# Patient Record
Sex: FEMALE | Race: WHITE | NOT HISPANIC OR LATINO | Employment: OTHER | URBAN - NONMETROPOLITAN AREA
[De-identification: names, ages, dates, MRNs, and addresses within clinical notes are randomized per-mention and may not be internally consistent; named-entity substitution may affect disease eponyms.]

---

## 2017-01-09 RX ORDER — ALPRAZOLAM 0.5 MG/1
0.5 TABLET ORAL 2 TIMES DAILY PRN
Qty: 30 TABLET | Refills: 0 | Status: SHIPPED | OUTPATIENT
Start: 2017-01-09 | End: 2017-03-30

## 2017-01-09 NOTE — TELEPHONE ENCOUNTER
Patient's spouse  last night and family request something for anxiety. Xanax prescribed and family will . Viktor performed

## 2017-02-14 ENCOUNTER — TELEPHONE (OUTPATIENT)
Dept: FAMILY MEDICINE CLINIC | Facility: CLINIC | Age: 77
End: 2017-02-14

## 2017-02-14 NOTE — TELEPHONE ENCOUNTER
[facility] called with patient PT/INR 4.06. Patient is taking Coumadin 5mg daily. Instructed to hold Coumadin until patient apt tomorrow 02/15/17. Home Health states will call and instruct patient

## 2017-02-15 ENCOUNTER — OFFICE VISIT (OUTPATIENT)
Dept: FAMILY MEDICINE CLINIC | Facility: CLINIC | Age: 77
End: 2017-02-15

## 2017-02-15 VITALS
HEART RATE: 101 BPM | OXYGEN SATURATION: 97 % | TEMPERATURE: 97.6 F | SYSTOLIC BLOOD PRESSURE: 120 MMHG | HEIGHT: 66 IN | BODY MASS INDEX: 28.61 KG/M2 | DIASTOLIC BLOOD PRESSURE: 70 MMHG | WEIGHT: 178 LBS

## 2017-02-15 DIAGNOSIS — J18.9 PNEUMONIA DUE TO INFECTIOUS ORGANISM, UNSPECIFIED LATERALITY, UNSPECIFIED PART OF LUNG: Primary | ICD-10-CM

## 2017-02-15 DIAGNOSIS — I48.20 CHRONIC ATRIAL FIBRILLATION (HCC): ICD-10-CM

## 2017-02-15 DIAGNOSIS — F41.1 GAD (GENERALIZED ANXIETY DISORDER): ICD-10-CM

## 2017-02-15 DIAGNOSIS — D68.32 WARFARIN-INDUCED COAGULOPATHY (HCC): ICD-10-CM

## 2017-02-15 DIAGNOSIS — J44.1 COPD WITH EXACERBATION (HCC): ICD-10-CM

## 2017-02-15 DIAGNOSIS — T45.515A WARFARIN-INDUCED COAGULOPATHY (HCC): ICD-10-CM

## 2017-02-15 DIAGNOSIS — H61.23 EXCESSIVE CERUMEN IN BOTH EAR CANALS: ICD-10-CM

## 2017-02-15 PROCEDURE — 99214 OFFICE O/P EST MOD 30 MIN: CPT | Performed by: INTERNAL MEDICINE

## 2017-02-15 PROCEDURE — 69210 REMOVE IMPACTED EAR WAX UNI: CPT | Performed by: INTERNAL MEDICINE

## 2017-02-15 RX ORDER — WARFARIN SODIUM 5 MG/1
5 TABLET ORAL
COMMUNITY
End: 2017-07-26 | Stop reason: DRUGHIGH

## 2017-02-15 RX ORDER — POLYETHYLENE GLYCOL 3350 17 G/17G
17 POWDER, FOR SOLUTION ORAL DAILY
COMMUNITY

## 2017-02-15 RX ORDER — IPRATROPIUM BROMIDE AND ALBUTEROL SULFATE 2.5; .5 MG/3ML; MG/3ML
3 SOLUTION RESPIRATORY (INHALATION) EVERY 6 HOURS
COMMUNITY
End: 2017-02-27 | Stop reason: SDUPTHER

## 2017-02-15 RX ORDER — FLUCONAZOLE 150 MG/1
150 TABLET ORAL DAILY
COMMUNITY
End: 2017-03-30

## 2017-02-15 RX ORDER — AMOXICILLIN AND CLAVULANATE POTASSIUM 875; 125 MG/1; MG/1
1 TABLET, FILM COATED ORAL 2 TIMES DAILY
COMMUNITY
End: 2017-03-23 | Stop reason: ALTCHOICE

## 2017-02-15 RX ORDER — MIRTAZAPINE 15 MG/1
15 TABLET, FILM COATED ORAL NIGHTLY
Qty: 30 TABLET | Refills: 11 | Status: SHIPPED | OUTPATIENT
Start: 2017-02-15

## 2017-02-15 NOTE — PROGRESS NOTES
Subjective          History of Present Illness     Kathy Villanueva is a 76 y.o. female with a history of COPD here for hospital follow up.  She resided at [facility] for six years and went  home to live with her son and daughter-in-law last fall. She was admitted to [facility] on 02/05/2017 with dyspnea and shortness of breath consistent with acute COPD exacerbation and pneumonia and discharged on 02/10/17.  She was treated with IV Solu-Medrol, nebulizer treatments and empiric antibiotic coverage with a 10-day course of Augmentin.  She continues on four Duoneb treatments daily since discharge.  [facility] hospital called yesterday with patient PT/INR 4.06.  Patient was taking Coumadin 5mg daily per discharge instructions from the hospital.  We instructed Home Health to let her know to hold Coumadin until patient she came in today.  We further instructed her today to resume Coumadin at 1/2 tablet of the Coumadin 4 mg (2 mg) to start Thursday evening and continue until Tuesday evening resuming the 4 mg dose when she completes her course of Levaquin.  She denies any evidence of GI blood loss.    Her daughter reports she has some increased episodes of daytime anxiety and agitation, for which she was given IV medication in the hospital and Kathy responded very well.  They are unsure of the name of the medication.  She uses Xanax p.r.n. And continues on Effexor XR.   She reports she is sleeping well since returning home.  We are going to add Remeron to take nightly.      She is reporting recent hearing loss on the left.  Exam today reveals cerumen impaction bilateral ear canals, left greater than right.  Warm water ear lavage irrigation is performed today to bilateral ear canals without difficulty or complications.  She tolerated well.   Recommended ear wax softening drops to    Review of Systems   Constitutional: Negative for chills, fatigue and fever.   HENT: Negative for congestion, ear pain, postnasal drip,  sinus pressure and sore throat.    Respiratory: Negative for cough, shortness of breath and wheezing.    Cardiovascular: Negative for chest pain, palpitations and leg swelling.   Gastrointestinal: Negative for abdominal pain, blood in stool, constipation, diarrhea, nausea and vomiting.   Endocrine: Negative for cold intolerance, heat intolerance, polydipsia and polyuria.   Genitourinary: Negative for dysuria, frequency, hematuria and urgency.   Skin: Negative for rash.   Neurological: Negative for syncope and weakness.      Ear Cerumen Removal Instrumentation  Date/Time: 2/15/2017 2:02 PM  Performed by: LEIGHA HOFFMAN  Authorized by: LEIGHA HOFFMAN  Local anesthetic: none  Location details: bilateral ears  Procedure type: irrigation  Patient sedated: no  Patient tolerance: Patient tolerated the procedure well with no immediate complications  Comments: The patient received warm water lavage irrigation to the bilateral ears without difficulty or complication.        Objective     Physical Exam   Constitutional: She is oriented to person, place, and time. She appears well-developed and well-nourished. No distress.   HENT:   Head: Normocephalic and atraumatic.   Nose: Right sinus exhibits no maxillary sinus tenderness and no frontal sinus tenderness. Left sinus exhibits no maxillary sinus tenderness and no frontal sinus tenderness.   Mouth/Throat: Uvula is midline, oropharynx is clear and moist and mucous membranes are normal. No oral lesions. No tonsillar exudate.   Bilateral cerumen impaction.    Eyes: Conjunctivae and EOM are normal. Pupils are equal, round, and reactive to light.   Neck: Trachea normal. Neck supple. No JVD present. Carotid bruit is not present. No tracheal deviation present. No thyroid mass and no thyromegaly present.   Cardiovascular: Normal rate and normal heart sounds.   No extrasystoles are present. PMI is not displaced.    No murmur heard.  Irregularly irregular rhythm   Pulmonary/Chest: Effort  normal. No accessory muscle usage. No respiratory distress. She has no decreased breath sounds. She has wheezes. She has rhonchi. She has no rales.   Increased bronchial sounds.   Bilateral scattered rhonchi and wheezes   Abdominal: Soft. Bowel sounds are normal. She exhibits no distension. There is no hepatosplenomegaly. There is no tenderness.       Vascular Status -  Her exam exhibits right foot vasculature normal and right foot edema (Trace edema right ankle. ). Her exam exhibits left foot vasculature normal. Her exam exhibits no left foot edema.  Lymphadenopathy:     She has no cervical adenopathy.   Neurological: She is alert and oriented to person, place, and time. No cranial nerve deficit. Coordination normal.   Chronic right-sided weakness.      Skin: Skin is warm, dry and intact. No rash noted. No cyanosis. Nails show no clubbing.   Psychiatric: She has a normal mood and affect. Her speech is normal and behavior is normal. Thought content normal.   Vitals reviewed.           Assessment/Plan      After informed verbal consent, warm water ear lavage irrigation is performed to bilateral ear canals today without difficulty or complications.  She tolerated well.       Add Remeron to take 1 tablet by mouth q.h.s. for anxiety/agitation.     We will have her stop by for a repeat chest x-ray to document clearing of pneumonia.     Instructed to resume Coumadin at 1/2 tablet of the Coumadin 4 mg (2 mg) to start Thursday evening and continue until Tuesday evening resuming the 4 mg dose when she completes her course of Levaquin.  I'll have home health obtain a weekly pro time for the next 3 weeks.    Scribed for Dr. Delcid by Harika Linn [facility].     Diagnoses and all orders for this visit:    Pneumonia due to infectious organism, unspecified laterality, unspecified part of lung  -     XR Chest PA & Lateral    Excessive cerumen in both ear canals  -     Ear Cerumen Removal Instrumentation    COPD with  exacerbation  -     XR Chest PA & Lateral    PRASHANTH (generalized anxiety disorder)  Comments:  On Effexor.  Uses Xanax as needed.  Added Remeron, 2/2017    Warfarin-induced coagulopathy  Comments:  Likely aggravated by antibiotics during hospitalization, 2/2017    Chronic atrial fibrillation    Other orders  -     amoxicillin-clavulanate (AUGMENTIN) 875-125 MG per tablet; Take 1 tablet by mouth 2 (Two) Times a Day.  -     fluconazole (DIFLUCAN) 150 MG tablet; Take 150 mg by mouth Daily. X 7 days  -     polyethylene glycol (MIRALAX) packet; Take 17 g by mouth Daily.  -     ipratropium-albuterol (DUO-NEB) 0.5-2.5 mg/mL nebulizer; Take 3 mL by nebulization Every 6 (Six) Hours.  -     warfarin (COUMADIN) 5 MG tablet; Take 5 mg by mouth Daily.  -     mirtazapine (REMERON) 15 MG tablet; Take 1 tablet by mouth Every Night.        No visits with results within 3 Week(s) from this visit.  Latest known visit with results is:    Hospital Outpatient Visit on 11/11/2016   Component Date Value Ref Range Status   • Protime 11/11/2016 29.1* 11.1 - 15.3 seconds Final   • INR 11/11/2016 2.9* 0.8 - 1.2 Final    Comment: Therapeutic range for most indications is 2.0 - 3.0 INR or 2.5 - 3.5 for  mechanical   heart valves.     ]

## 2017-02-27 RX ORDER — GUAIFENESIN 600 MG/1
600 TABLET, EXTENDED RELEASE ORAL 2 TIMES DAILY
Qty: 30 TABLET | Refills: 0 | Status: SHIPPED | OUTPATIENT
Start: 2017-02-27 | End: 2017-03-30

## 2017-02-27 RX ORDER — IPRATROPIUM BROMIDE AND ALBUTEROL SULFATE 2.5; .5 MG/3ML; MG/3ML
3 SOLUTION RESPIRATORY (INHALATION) EVERY 6 HOURS
Qty: 360 ML | Refills: 5 | Status: SHIPPED | OUTPATIENT
Start: 2017-02-27 | End: 2017-09-11 | Stop reason: SDUPTHER

## 2017-03-13 ENCOUNTER — TELEPHONE (OUTPATIENT)
Dept: FAMILY MEDICINE CLINIC | Facility: CLINIC | Age: 77
End: 2017-03-13

## 2017-03-13 NOTE — TELEPHONE ENCOUNTER
Spoke to Sejal from [facility] regarding lab order for this patient. She needs CMP, CBC, Protime every 2 weeks x 2 months. Start the week of the 13th.

## 2017-03-20 ENCOUNTER — TELEPHONE (OUTPATIENT)
Dept: FAMILY MEDICINE CLINIC | Facility: CLINIC | Age: 77
End: 2017-03-20

## 2017-03-22 ENCOUNTER — TELEPHONE (OUTPATIENT)
Dept: FAMILY MEDICINE CLINIC | Facility: CLINIC | Age: 77
End: 2017-03-22

## 2017-03-22 NOTE — TELEPHONE ENCOUNTER
I called and spoke to Sejal at [facility] regarding positive stool. Instructed to inform us when last one obtained and we will arrange apt. For patient. She states the stool samples should be completed the next 2 days and she will also relay to patient regarding apt.

## 2017-03-23 RX ORDER — CEFUROXIME AXETIL 500 MG/1
500 TABLET ORAL 2 TIMES DAILY
Qty: 14 TABLET | Refills: 0 | Status: SHIPPED | OUTPATIENT
Start: 2017-03-23 | End: 2017-03-30

## 2017-03-27 ENCOUNTER — TELEPHONE (OUTPATIENT)
Dept: FAMILY MEDICINE CLINIC | Facility: CLINIC | Age: 77
End: 2017-03-27

## 2017-03-27 NOTE — TELEPHONE ENCOUNTER
Spoke to Adilia at [facility] regarding protime/inr of 4.97. She takes Coumadin 4mg daily. Dr. Delcid ordered to hold Coumadin x 3 days and recheck on the 29th. She has an apt on the 30th. Adilia will call and inform patient.

## 2017-03-27 NOTE — TELEPHONE ENCOUNTER
Spoke to Sejal at [facility] regarding abnormal liver function. Dr. Delcid ordered to stop Simvastatin and home health to draw CMP weekly x 4 weeks. Voiced understanding.

## 2017-03-30 ENCOUNTER — TELEPHONE (OUTPATIENT)
Dept: FAMILY MEDICINE CLINIC | Facility: CLINIC | Age: 77
End: 2017-03-30

## 2017-03-30 ENCOUNTER — OFFICE VISIT (OUTPATIENT)
Dept: FAMILY MEDICINE CLINIC | Facility: CLINIC | Age: 77
End: 2017-03-30

## 2017-03-30 VITALS
TEMPERATURE: 96.1 F | BODY MASS INDEX: 28.61 KG/M2 | WEIGHT: 178 LBS | HEIGHT: 66 IN | SYSTOLIC BLOOD PRESSURE: 120 MMHG | HEART RATE: 80 BPM | OXYGEN SATURATION: 89 % | DIASTOLIC BLOOD PRESSURE: 60 MMHG

## 2017-03-30 DIAGNOSIS — I48.20 CHRONIC ATRIAL FIBRILLATION (HCC): ICD-10-CM

## 2017-03-30 DIAGNOSIS — S81.811A NONINFECTED SKIN TEAR OF LOWER EXTREMITY, RIGHT, INITIAL ENCOUNTER: ICD-10-CM

## 2017-03-30 DIAGNOSIS — D64.9 ANEMIA, UNSPECIFIED TYPE: Primary | ICD-10-CM

## 2017-03-30 DIAGNOSIS — S00.83XA HEMATOMA OF FACE, INITIAL ENCOUNTER: ICD-10-CM

## 2017-03-30 DIAGNOSIS — IMO0001 FALL IN ELDERLY PATIENT, INITIAL ENCOUNTER: ICD-10-CM

## 2017-03-30 DIAGNOSIS — S80.02XA CONTUSION OF LEFT KNEE, INITIAL ENCOUNTER: ICD-10-CM

## 2017-03-30 DIAGNOSIS — R79.89 ABNORMAL LFTS: ICD-10-CM

## 2017-03-30 PROCEDURE — 99214 OFFICE O/P EST MOD 30 MIN: CPT | Performed by: INTERNAL MEDICINE

## 2017-03-30 NOTE — TELEPHONE ENCOUNTER
I called and spoke to Linda at [facility] with new orders. Patient continue to hold Coumadin and draw Protime,Ferritin and Vit b12 tomorrow then after results of Protime will determine dosage of Coumadin. Lab draw weekly for CMP,CBC,Protimes

## 2017-03-30 NOTE — PROGRESS NOTES
Subjective     Kathy Villanueva is a 76 y.o. female.     History of Present Illness   Kathy is our very complex patient with multiple medical issues including cerebrovascular disease and COPD and chronic atrial fibrillation on Coumadin.  Several new issues have recently developed.  He continues to live at home with her adult son and daughter-in-law.    The patient experienced a fall at home 6 days ago.  She did not seek medical attention for the fall.  She was using her walker, but still fell in the home.  Her daughter-in-law was there at the time.  She has a large hematoma on the left side of the face.  She has a resolving contusion on the left knee.  She has a skin tear on the right anterior shin.  We reviewed wound care instructions.  Home health is following this.  She has lots of bruises.  PolyMem is covering the skin tear on the right shin.  Serous drainage is starting to slow.  They are keeping the legs elevated.    She is completing antibiotic for respiratory tract infection.  Her most recent pro time was elevated.  We have her holding Coumadin currently.  We will resume Coumadin when she is once again therapeutic.    Her labs reveal a worsening anemia.  When she was hospitalized last month, she required 2 units packed red blood cells.  Her baseline hemoglobin is around 11.  Most recent hemoglobin is 9.7.  Stool for Hemoccult is positive ×1.  The family has not been able to collect the other 2 stools.  We discussed workup of the GI blood loss.  She denies upper GI symptoms on her Protonix.  At the current time, they have chosen not to pursue a GI workup.  They understand the situation fully.  They feel that it would be quite stressful for the patient to undergo GI workup at this time.  We agreed to continue to reassess the issue based on her blood counts.  I'm going to have home health check ferritin and vitamin B12 level with her next lab draw.    Her recent labs also demonstrated markedly elevated LFTs.   "Etiology is undetermined.  I am hopeful this is due to the fall and possible hepatic contusion.  She is not tender on abdominal exam today.  Home health will check a CMP weekly.        Review of Systems   Constitutional: Positive for fatigue. Negative for chills and fever.   HENT: Negative for congestion, ear pain, postnasal drip, sinus pressure and sore throat.    Respiratory: Negative for cough, shortness of breath and wheezing.    Cardiovascular: Negative for chest pain, palpitations and leg swelling.   Gastrointestinal: Negative for abdominal pain, blood in stool, constipation, diarrhea, nausea and vomiting.   Endocrine: Negative for cold intolerance, heat intolerance, polydipsia and polyuria.   Genitourinary: Negative for dysuria, frequency, hematuria and urgency.   Skin: Positive for wound. Negative for rash.   Neurological: Positive for weakness. Negative for dizziness, syncope and light-headedness.       Objective     /60  Pulse 80  Temp 96.1 °F (35.6 °C)  Ht 66\" (167.6 cm)  Wt 178 lb (80.7 kg)  SpO2 (!) 89% Comment: 3 l/m  BMI 28.73 kg/m2    Physical Exam   Constitutional: She is oriented to person, place, and time. She appears well-developed and well-nourished. No distress.   Seated in a wheelchair, accompanied by her son and daughter-in-law.  Wearing supplemental oxygen.   HENT:   Head: Normocephalic and atraumatic.   Nose: Nose normal.   Mouth/Throat: Oropharynx is clear and moist. No oropharyngeal exudate.   Eyes: EOM are normal. Pupils are equal, round, and reactive to light.   Neck: Neck supple. No JVD present. No thyromegaly present.   Cardiovascular: Normal rate, regular rhythm and normal heart sounds.    Pulmonary/Chest: Effort normal. No accessory muscle usage. No respiratory distress. She has wheezes (Bilateral). Rhonchi: bilateral  She has no rales.   Chronic lung sounds and a few expiratory wheezes, but no rhonchi   Abdominal: Soft. Bowel sounds are normal. She exhibits no " distension. There is no tenderness.   Musculoskeletal: She exhibits no edema.   Lots of bruising on the left side of the face, but no evidence of facial fracture.   Lymphadenopathy:     She has no cervical adenopathy.   Neurological: She is alert and oriented to person, place, and time. No cranial nerve deficit.   Chronic right-sided weakness    Skin:   4 cm Skin tear is noted on the anterior shin of the right lower extremity.  No evidence of secondary cellulitis.    Evidence of healing contusion of the left knee.    Resolving hematoma of the left side of the face.   Psychiatric: She has a normal mood and affect. Her speech is normal and behavior is normal.   Vitals reviewed.      PHQ-9 Depression Screening 3/30/2017   Little interest or pleasure in doing things 0   Feeling down, depressed, or hopeless 0   Trouble falling or staying asleep, or sleeping too much 3   Feeling tired or having little energy 3   Poor appetite or overeating 2   Trouble concentrating on things, such as reading the newspaper or watching television 1   Moving or speaking so slowly that other people could have noticed. Or the opposite - being so fidgety or restless that you have been moving around a lot more than usual 3   PHQ-9 Total Score 12   If you checked off any problems, how difficult have these problems made it for you to do your work, take care of things at home, or get along with other people? Very difficult       Assessment/Plan   I will have home health continue to assist in wound care.  We have ordered additional amounts of PolyMem and discussed appropriate frequency of changes of the skin tear.    We currently have her holding Coumadin due to supratherapeutic level, possibly aggravated by recent antibiotics are now completed.  We'll have home health repeat a pro time tomorrow morning.  Her previous dose of Coumadin was 4 mg daily.    After discussing the Hemoccult positive stool and worsened anemia, they have decided against any  current referral for a GI workup.  They will continue to watch for signs or symptoms of blood in the stool.  We will have home health continue to follow her pro time and CBC closely.    The etiology of her elevated LFTs is undetermined.  It may be related to her recent fall.  We will have home health check a CBC weekly.  If still markedly elevated with recheck, we will have them hold simvastatin.    Fall precautions are reinforced.  Continue to use the walker.      Diagnoses and all orders for this visit:    Anemia, unspecified type-Hemoccult positive stool, 3/2017.  Declined GI workup    Chronic atrial fibrillation    Abnormal LFTs    Fall in elderly patient, initial encounter    Hematoma of face, initial encounter    Contusion of left knee, initial encounter    Noninfected skin tear of lower extremity, right, initial encounter        No visits with results within 3 Week(s) from this visit.  Latest known visit with results is:    Hospital Outpatient Visit on 11/11/2016   Component Date Value Ref Range Status   • Protime 11/11/2016 29.1* 11.1 - 15.3 seconds Final   • INR 11/11/2016 2.9* 0.8 - 1.2 Final    Comment: Therapeutic range for most indications is 2.0 - 3.0 INR or 2.5 - 3.5 for  mechanical   heart valves.     ]

## 2017-03-31 ENCOUNTER — TELEPHONE (OUTPATIENT)
Dept: FAMILY MEDICINE CLINIC | Facility: CLINIC | Age: 77
End: 2017-03-31

## 2017-03-31 NOTE — TELEPHONE ENCOUNTER
Spoke to Tiffany at [facility]. Dr. Delcid ordered to resume prior dose of Coumadin which was 4mg daily and Home health is rechecking weekly. TP

## 2017-04-06 ENCOUNTER — TELEPHONE (OUTPATIENT)
Dept: FAMILY MEDICINE CLINIC | Facility: CLINIC | Age: 77
End: 2017-04-06

## 2017-04-06 NOTE — TELEPHONE ENCOUNTER
I spoke to Sejal at [facility] 04/05/17.  Patient had been on Coumadin 3 days before being rechecked.  Instructed to continue same regimen and  recheck in 1 week.

## 2017-05-09 ENCOUNTER — TELEPHONE (OUTPATIENT)
Dept: FAMILY MEDICINE CLINIC | Facility: CLINIC | Age: 77
End: 2017-05-09

## 2017-05-26 ENCOUNTER — TELEPHONE (OUTPATIENT)
Dept: FAMILY MEDICINE CLINIC | Facility: CLINIC | Age: 77
End: 2017-05-26

## 2017-05-30 ENCOUNTER — OFFICE VISIT (OUTPATIENT)
Dept: FAMILY MEDICINE CLINIC | Facility: CLINIC | Age: 77
End: 2017-05-30

## 2017-05-30 VITALS
HEIGHT: 66 IN | TEMPERATURE: 97.8 F | DIASTOLIC BLOOD PRESSURE: 60 MMHG | HEART RATE: 89 BPM | OXYGEN SATURATION: 92 % | SYSTOLIC BLOOD PRESSURE: 130 MMHG | BODY MASS INDEX: 28.28 KG/M2 | WEIGHT: 176 LBS

## 2017-05-30 DIAGNOSIS — I10 ESSENTIAL HYPERTENSION: Primary | ICD-10-CM

## 2017-05-30 DIAGNOSIS — I48.20 CHRONIC ATRIAL FIBRILLATION (HCC): ICD-10-CM

## 2017-05-30 DIAGNOSIS — E87.6 HYPOKALEMIA: ICD-10-CM

## 2017-05-30 DIAGNOSIS — J43.1 PANLOBULAR EMPHYSEMA (HCC): ICD-10-CM

## 2017-05-30 DIAGNOSIS — R79.89 ABNORMAL LFTS: ICD-10-CM

## 2017-05-30 DIAGNOSIS — I50.22 CHRONIC SYSTOLIC CONGESTIVE HEART FAILURE (HCC): ICD-10-CM

## 2017-05-30 LAB
ALBUMIN SERPL-MCNC: 3.2 G/DL (ref 3.2–5.5)
ALBUMIN/GLOB SERPL: 0.8 G/DL (ref 1–3)
ALP SERPL-CCNC: 101 U/L (ref 15–121)
ALT SERPL W P-5'-P-CCNC: 42 U/L (ref 10–60)
ANION GAP SERPL CALCULATED.3IONS-SCNC: 10 MMOL/L (ref 5–15)
AST SERPL-CCNC: 23 U/L (ref 10–60)
BILIRUB SERPL-MCNC: 0.4 MG/DL (ref 0.2–1)
BNP SERPL-MCNC: 231 PG/ML (ref 0–100)
BUN BLD-MCNC: 24 MG/DL (ref 8–25)
BUN/CREAT SERPL: 30 (ref 7–25)
CALCIUM SPEC-SCNC: 9 MG/DL (ref 8.4–10.8)
CHLORIDE SERPL-SCNC: 98 MMOL/L (ref 100–112)
CO2 SERPL-SCNC: 29 MMOL/L (ref 20–32)
CREAT BLD-MCNC: 0.8 MG/DL (ref 0.4–1.3)
GFR SERPL CREATININE-BSD FRML MDRD: 69 ML/MIN/1.73 (ref 39–90)
GLOBULIN UR ELPH-MCNC: 3.9 GM/DL (ref 2.5–4.6)
GLUCOSE BLD-MCNC: 137 MG/DL (ref 70–100)
POTASSIUM BLD-SCNC: 5.2 MMOL/L (ref 3.4–5.4)
PROT SERPL-MCNC: 7.1 G/DL (ref 6.7–8.2)
SODIUM BLD-SCNC: 137 MMOL/L (ref 134–146)

## 2017-05-30 PROCEDURE — 83880 ASSAY OF NATRIURETIC PEPTIDE: CPT | Performed by: INTERNAL MEDICINE

## 2017-05-30 PROCEDURE — 80053 COMPREHEN METABOLIC PANEL: CPT | Performed by: INTERNAL MEDICINE

## 2017-05-30 PROCEDURE — 99214 OFFICE O/P EST MOD 30 MIN: CPT | Performed by: INTERNAL MEDICINE

## 2017-05-30 PROCEDURE — 36415 COLL VENOUS BLD VENIPUNCTURE: CPT | Performed by: INTERNAL MEDICINE

## 2017-05-30 PROCEDURE — 85025 COMPLETE CBC W/AUTO DIFF WBC: CPT | Performed by: INTERNAL MEDICINE

## 2017-05-30 PROCEDURE — 85060 BLOOD SMEAR INTERPRETATION: CPT | Performed by: INTERNAL MEDICINE

## 2017-05-30 RX ORDER — FUROSEMIDE 20 MG/1
20 TABLET ORAL EVERY MORNING
Qty: 30 TABLET | Refills: 11 | COMMUNITY
Start: 2017-05-30 | End: 2017-06-23 | Stop reason: SDUPTHER

## 2017-05-30 RX ORDER — ATORVASTATIN CALCIUM 20 MG/1
20 TABLET, FILM COATED ORAL EVERY OTHER DAY
Qty: 15 TABLET | Refills: 11 | Status: SHIPPED | OUTPATIENT
Start: 2017-05-30 | End: 2017-07-17 | Stop reason: SDUPTHER

## 2017-05-30 RX ORDER — ATORVASTATIN CALCIUM 20 MG/1
20 TABLET, FILM COATED ORAL DAILY
Qty: 30 TABLET | Refills: 11 | Status: SHIPPED | OUTPATIENT
Start: 2017-05-30 | End: 2017-05-30

## 2017-05-31 LAB
BASOPHILS # BLD AUTO: 0.01 10*3/MM3 (ref 0–0.2)
BASOPHILS NFR BLD AUTO: 0.1 % (ref 0–2)
CYTOLOGIST CVX/VAG CYTO: NORMAL
DEPRECATED RDW RBC AUTO: 56.8 FL (ref 36.4–46.3)
EOSINOPHIL # BLD AUTO: 0.08 10*3/MM3 (ref 0–0.7)
EOSINOPHIL NFR BLD AUTO: 0.5 % (ref 0–7)
ERYTHROCYTE [DISTWIDTH] IN BLOOD BY AUTOMATED COUNT: 17.2 % (ref 11.5–14.5)
HCT VFR BLD AUTO: 37.2 % (ref 35–45)
HGB BLD-MCNC: 11.8 G/DL (ref 12–15.5)
LYMPHOCYTES # BLD AUTO: 0.9 10*3/MM3 (ref 0.6–4.2)
LYMPHOCYTES NFR BLD AUTO: 5.9 % (ref 10–50)
MCH RBC QN AUTO: 29.6 PG (ref 26.5–34)
MCHC RBC AUTO-ENTMCNC: 31.7 G/DL (ref 31.4–36)
MCV RBC AUTO: 93.2 FL (ref 80–98)
MONOCYTES # BLD AUTO: 0.64 10*3/MM3 (ref 0–0.9)
MONOCYTES NFR BLD AUTO: 4.2 % (ref 0–12)
NEUTROPHILS # BLD AUTO: 13.73 10*3/MM3 (ref 2–8.6)
NEUTROPHILS NFR BLD AUTO: 89.3 % (ref 37–80)
PATH INTERP BLD-IMP: NORMAL
PLAT MORPH BLD: NORMAL
PLATELET # BLD AUTO: 335 10*3/MM3 (ref 150–450)
PMV BLD AUTO: 9.1 FL (ref 8–12)
RBC # BLD AUTO: 3.99 10*6/MM3 (ref 3.77–5.16)
RBC MORPH BLD: NORMAL
WBC MORPH BLD: NORMAL
WBC NRBC COR # BLD: 15.36 10*3/MM3 (ref 3.2–9.8)

## 2017-06-13 ENCOUNTER — TELEPHONE (OUTPATIENT)
Dept: FAMILY MEDICINE CLINIC | Facility: CLINIC | Age: 77
End: 2017-06-13

## 2017-06-13 RX ORDER — LISINOPRIL 10 MG/1
10 TABLET ORAL 2 TIMES DAILY
Qty: 60 TABLET | Refills: 11 | Status: SHIPPED | OUTPATIENT
Start: 2017-06-13 | End: 2017-06-14 | Stop reason: ALTCHOICE

## 2017-06-13 NOTE — TELEPHONE ENCOUNTER
I spoke to Tiffany at [facility] and informed to continue Coumadin at 4mg daily and recheck in 1 month. Her INR was 3.16. TP

## 2017-06-23 ENCOUNTER — TELEPHONE (OUTPATIENT)
Dept: FAMILY MEDICINE CLINIC | Facility: CLINIC | Age: 77
End: 2017-06-23

## 2017-06-23 RX ORDER — FUROSEMIDE 20 MG/1
20 TABLET ORAL EVERY MORNING
Qty: 30 TABLET | Refills: 11 | Status: SHIPPED | OUTPATIENT
Start: 2017-06-23 | End: 2018-05-04 | Stop reason: ALTCHOICE

## 2017-07-17 RX ORDER — ATORVASTATIN CALCIUM 20 MG/1
20 TABLET, FILM COATED ORAL EVERY OTHER DAY
Qty: 45 TABLET | Refills: 3 | Status: SHIPPED | OUTPATIENT
Start: 2017-07-17 | End: 2017-07-26 | Stop reason: ALTCHOICE

## 2017-07-26 ENCOUNTER — TELEPHONE (OUTPATIENT)
Dept: FAMILY MEDICINE CLINIC | Facility: CLINIC | Age: 77
End: 2017-07-26

## 2017-07-26 ENCOUNTER — OFFICE VISIT (OUTPATIENT)
Dept: FAMILY MEDICINE CLINIC | Facility: CLINIC | Age: 77
End: 2017-07-26

## 2017-07-26 ENCOUNTER — LAB (OUTPATIENT)
Dept: LAB | Facility: OTHER | Age: 77
End: 2017-07-26

## 2017-07-26 VITALS
OXYGEN SATURATION: 95 % | TEMPERATURE: 97 F | DIASTOLIC BLOOD PRESSURE: 64 MMHG | BODY MASS INDEX: 29.21 KG/M2 | HEART RATE: 74 BPM | WEIGHT: 181 LBS | SYSTOLIC BLOOD PRESSURE: 110 MMHG

## 2017-07-26 DIAGNOSIS — G40.909 SEIZURE DISORDER (HCC): Chronic | ICD-10-CM

## 2017-07-26 DIAGNOSIS — I50.22 CHRONIC SYSTOLIC CONGESTIVE HEART FAILURE (HCC): Chronic | ICD-10-CM

## 2017-07-26 DIAGNOSIS — J43.1 PANLOBULAR EMPHYSEMA (HCC): Primary | Chronic | ICD-10-CM

## 2017-07-26 DIAGNOSIS — Z79.01 ANTICOAGULANT LONG-TERM USE: ICD-10-CM

## 2017-07-26 DIAGNOSIS — D51.8 VITAMIN B12 DEFICIENCY (DIETARY) ANEMIA: Chronic | ICD-10-CM

## 2017-07-26 DIAGNOSIS — I48.20 CHRONIC ATRIAL FIBRILLATION (HCC): Chronic | ICD-10-CM

## 2017-07-26 DIAGNOSIS — K21.9 GASTROESOPHAGEAL REFLUX DISEASE WITHOUT ESOPHAGITIS: Chronic | ICD-10-CM

## 2017-07-26 LAB
INR PPP: 3.55 (ref 0.8–1.2)
PROTHROMBIN TIME: 36.1 SECONDS (ref 11.1–15.3)

## 2017-07-26 PROCEDURE — 85610 PROTHROMBIN TIME: CPT | Performed by: INTERNAL MEDICINE

## 2017-07-26 PROCEDURE — 36415 COLL VENOUS BLD VENIPUNCTURE: CPT | Performed by: INTERNAL MEDICINE

## 2017-07-26 PROCEDURE — 99214 OFFICE O/P EST MOD 30 MIN: CPT | Performed by: INTERNAL MEDICINE

## 2017-07-26 RX ORDER — ALBUTEROL SULFATE 2.5 MG/3ML
3 SOLUTION RESPIRATORY (INHALATION) 4 TIMES DAILY PRN
COMMUNITY

## 2017-07-26 RX ORDER — ALPRAZOLAM 0.5 MG/1
1 TABLET ORAL 2 TIMES DAILY
COMMUNITY

## 2017-07-26 RX ORDER — POTASSIUM CHLORIDE 750 MG/1
10 TABLET, FILM COATED, EXTENDED RELEASE ORAL DAILY
COMMUNITY
Start: 2017-06-08 | End: 2018-01-26 | Stop reason: SDUPTHER

## 2017-07-26 RX ORDER — BENZONATATE 200 MG/1
200 CAPSULE ORAL 3 TIMES DAILY PRN
Qty: 60 CAPSULE | Refills: 0 | Status: SHIPPED | OUTPATIENT
Start: 2017-07-26 | End: 2018-01-26

## 2017-07-26 NOTE — TELEPHONE ENCOUNTER
----- Message from Chuck Delcid MD sent at 7/26/2017  4:13 PM CDT -----  Contact the patient's family.  Hold Coumadin tonight.  Decrease Coumadin per protocol.  Have home health repeat a pro time in one week.  SHAWNA      07/26/2017   Spoke to Kim her Daughter in law. Instructed to hold Coumadin  tonight and restart at 4,4,2.  I spoke to Vicki at Home health will recheck next week. TP

## 2017-07-26 NOTE — PROGRESS NOTES
Subjective       History of Present Illness     Kathy Villanueva is a 77 y.o. female with a history of COPD among multiple medical issues here for a follow up hospitalization at [facility] for COPD exacerbation, possible pneumonia, and CHF exacerbation.  She was admitted on July 4th and discharged on July 15th with instructions including follow up PT/INR to be monitored with home health.  She was discharged with prednisone 20 mg taper and a follow up chest x-ray was recommended in 7-14 days.  Protime on the 17th was therapuetic.  She will have a repeat PT/INR and chest x-ray today.  Home Health continues to follow her since disharge.  She continues to have a nonproductive cough.  She is doing four nebulizer treatments q.i.d.  Her son reports she had good results with Tessalon Perles in the past.     There has been no seizure activity on the Dilantin.    Review of Systems   Constitutional: Negative for chills, fatigue and fever.   HENT: Negative for congestion, ear pain, postnasal drip, sinus pressure and sore throat.    Respiratory: Positive for cough and wheezing. Negative for shortness of breath.    Cardiovascular: Negative for chest pain, palpitations and leg swelling.   Gastrointestinal: Negative for abdominal pain, blood in stool, constipation, diarrhea, nausea and vomiting.   Endocrine: Negative for cold intolerance, heat intolerance, polydipsia and polyuria.   Genitourinary: Negative for dysuria, frequency, hematuria and urgency.   Skin: Negative for rash.   Neurological: Negative for syncope and weakness.      PHQ-9 Depression Screening 5/30/2017   Little interest or pleasure in doing things 0   Feeling down, depressed, or hopeless 0   Trouble falling or staying asleep, or sleeping too much 0   Feeling tired or having little energy 0   Poor appetite or overeating 0   Feeling bad about yourself - or that you are a failure or have let yourself or your family down 0   Trouble concentrating on things, such as  reading the newspaper or watching television 0   Moving or speaking so slowly that other people could have noticed. Or the opposite - being so fidgety or restless that you have been moving around a lot more than usual 0   Thoughts that you would be better off dead, or of hurting yourself in some way 0   PHQ-9 Total Score 0   If you checked off any problems, how difficult have these problems made it for you to do your work, take care of things at home, or get along with other people? Not difficult at all     Objective     Vitals:    07/26/17 1412   BP: 110/64   Pulse: 74   Temp: 97 °F (36.1 °C)   TempSrc: Oral   SpO2: 95%   Weight: 181 lb (82.1 kg)       Physical Exam   Constitutional: She is oriented to person, place, and time. She appears well-developed and well-nourished. No distress.   Seated in a wheelchair, accompanied by her son.  Wearing supplemental oxygen.     HENT:   Head: Normocephalic and atraumatic.   Nose: Right sinus exhibits no maxillary sinus tenderness and no frontal sinus tenderness. Left sinus exhibits no maxillary sinus tenderness and no frontal sinus tenderness.   Mouth/Throat: Uvula is midline, oropharynx is clear and moist and mucous membranes are normal. No oral lesions. No tonsillar exudate.   Eyes: Conjunctivae and EOM are normal. Pupils are equal, round, and reactive to light.   Neck: Trachea normal. Neck supple. No JVD present. Carotid bruit is not present. No tracheal deviation present. No thyroid mass and no thyromegaly present.   Cardiovascular: Normal rate and normal heart sounds.   No extrasystoles are present. PMI is not displaced.    No murmur heard.  Irregularly irregular rhythm with rate around 70.      Pulmonary/Chest: Effort normal. No accessory muscle usage. No respiratory distress. She has no decreased breath sounds. She has wheezes. She has no rhonchi. She has no rales.    A little bit of pleural rub on the left.  Chronic lung sounds and a few wheezes.     Abdominal: Soft.  Bowel sounds are normal. She exhibits no distension. There is no hepatosplenomegaly. There is no tenderness.       Vascular Status -  Her exam exhibits right foot vasculature normal. Her exam exhibits no right foot edema. Her exam exhibits left foot vasculature normal. Her exam exhibits no left foot edema.  Lymphadenopathy:     She has no cervical adenopathy.   Neurological: She is alert and oriented to person, place, and time. No cranial nerve deficit. Coordination normal.   Skin: Skin is warm, dry and intact. No rash noted. No cyanosis. Nails show no clubbing.   Psychiatric: She has a normal mood and affect. Her speech is normal and behavior is normal. Thought content normal.   Vitals reviewed.    Future Appointments  Date Time Provider Department Center   8/30/2017 3:00 PM Chuck Delcid MD [facility] None       Assessment/Plan      She will have a PT/INR checked today on her way out.  Home Health will continue to follow.  She will also get a chest x-ray to evaluate COPD.     Prescription is given for Tessalon Perles 200 mg to take 1 capsule by mouth t.i.d. p.r.n. cough     She will return on August 30th for routine follow up with fasting labs one week prior.      Scribed for Dr. Delcid by Harika Linn [facility].     Diagnoses and all orders for this visit:    Panlobular emphysema  -     XR Chest PA & Lateral    Chronic systolic congestive heart failure  -     XR Chest PA & Lateral  -     BNP; Future  -     Comprehensive Metabolic Panel; Future  -     Digoxin Level; Future    Chronic atrial fibrillation  -     Digoxin Level; Future  -     TSH; Future    Gastroesophageal reflux disease without esophagitis    Seizure disorder  -     Phenytoin (Dilantin), Serum; Future    Vitamin B12 deficiency (dietary) anemia  -     Vitamin B12; Future  -     CBC Auto Differential; Future  -     Comprehensive Metabolic Panel; Future    Other orders  -     albuterol (PROVENTIL) (2.5 MG/3ML) 0.083% nebulizer solution; Take 3 mL by  nebulization 4 (Four) Times a Day As Needed.  -     potassium chloride (K-DUR) 10 MEQ CR tablet; Take 10 mEq by mouth Daily.  -     sacubitril-valsartan (ENTRESTO) 49-51 MG tablet; Take 1 tablet by mouth Daily.  -     ALPRAZolam (XANAX) 0.5 MG tablet; Take 1 tablet by mouth 2 (Two) Times a Day.  -     benzonatate (TESSALON) 200 MG capsule; Take 1 capsule by mouth 3 (Three) Times a Day As Needed for Cough.      No visits with results within 3 Week(s) from this visit.  Latest known visit with results is:    Office Visit on 05/30/2017   Component Date Value Ref Range Status   • BNP 05/30/2017 231.0* 0.0 - 100.0 pg/mL Final   • Glucose 05/30/2017 137* 70 - 100 mg/dL Final   • BUN 05/30/2017 24  8 - 25 mg/dL Final   • Creatinine 05/30/2017 0.80  0.40 - 1.30 mg/dL Final   • Sodium 05/30/2017 137  134 - 146 mmol/L Final   • Potassium 05/30/2017 5.2  3.4 - 5.4 mmol/L Final   • Chloride 05/30/2017 98* 100 - 112 mmol/L Final   • CO2 05/30/2017 29.0  20.0 - 32.0 mmol/L Final   • Calcium 05/30/2017 9.0  8.4 - 10.8 mg/dL Final   • Total Protein 05/30/2017 7.1  6.7 - 8.2 g/dL Final   • Albumin 05/30/2017 3.20  3.20 - 5.50 g/dL Final   • ALT (SGPT) 05/30/2017 42  10 - 60 U/L Final   • AST (SGOT) 05/30/2017 23  10 - 60 U/L Final   • Alkaline Phosphatase 05/30/2017 101  15 - 121 U/L Final   • Total Bilirubin 05/30/2017 0.4  0.2 - 1.0 mg/dL Final   • eGFR Non African Amer 05/30/2017 69  39 - 90 mL/min/1.73 Final   • Globulin 05/30/2017 3.9  2.5 - 4.6 gm/dL Final   • A/G Ratio 05/30/2017 0.8* 1.0 - 3.0 g/dL Final   • BUN/Creatinine Ratio 05/30/2017 30.0* 7.0 - 25.0 Final   • Anion Gap 05/30/2017 10.0  5.0 - 15.0 mmol/L Final   • WBC 05/30/2017 15.36* 3.20 - 9.80 10*3/mm3 Final   • RBC 05/30/2017 3.99  3.77 - 5.16 10*6/mm3 Final   • Hemoglobin 05/30/2017 11.8* 12.0 - 15.5 g/dL Final   • Hematocrit 05/30/2017 37.2  35.0 - 45.0 % Final   • MCV 05/30/2017 93.2  80.0 - 98.0 fL Final   • MCH 05/30/2017 29.6  26.5 - 34.0 pg Final   • MCHC  05/30/2017 31.7  31.4 - 36.0 g/dL Final   • RDW 05/30/2017 17.2* 11.5 - 14.5 % Final   • RDW-SD 05/30/2017 56.8* 36.4 - 46.3 fl Final   • MPV 05/30/2017 9.1  8.0 - 12.0 fL Final   • Platelets 05/30/2017 335  150 - 450 10*3/mm3 Final   • Neutrophil % 05/30/2017 89.3* 37.0 - 80.0 % Final   • Lymphocyte % 05/30/2017 5.9* 10.0 - 50.0 % Final   • Monocyte % 05/30/2017 4.2  0.0 - 12.0 % Final   • Eosinophil % 05/30/2017 0.5  0.0 - 7.0 % Final   • Basophil % 05/30/2017 0.1  0.0 - 2.0 % Final   • Neutrophils, Absolute 05/30/2017 13.73* 2.00 - 8.60 10*3/mm3 Final   • Lymphocytes, Absolute 05/30/2017 0.90  0.60 - 4.20 10*3/mm3 Final   • Monocytes, Absolute 05/30/2017 0.64  0.00 - 0.90 10*3/mm3 Final   • Eosinophils, Absolute 05/30/2017 0.08  0.00 - 0.70 10*3/mm3 Final   • Basophils, Absolute 05/30/2017 0.01  0.00 - 0.20 10*3/mm3 Final   • Performed by: 05/30/2017 [facility] DAYSHIFT HEMATOLOGY   Final   • Pathologist Interpretation 05/30/2017 PERIPHERAL SMEAR REVIEW NORMAL   Final   • RBC Morphology 05/30/2017 Normal  Normal Final   • WBC Morphology 05/30/2017 Normal  Normal Final   • Platelet Morphology 05/30/2017 Normal  Normal Final   ]

## 2017-08-01 ENCOUNTER — TELEPHONE (OUTPATIENT)
Dept: FAMILY MEDICINE CLINIC | Facility: CLINIC | Age: 77
End: 2017-08-01

## 2017-08-01 NOTE — TELEPHONE ENCOUNTER
I spoke to Rehana at [facility]. Patient INR was 1.21. Last week we had changed Coumadin dosage to regimen 4 mg,4 mg, 2 mg. Family was giving 2mg,2mg 4mg. I explained to daughter and she will resume that dosage. Home health will recheck in 2 weeks. GLENN

## 2017-08-15 ENCOUNTER — TELEPHONE (OUTPATIENT)
Dept: FAMILY MEDICINE CLINIC | Facility: CLINIC | Age: 77
End: 2017-08-15

## 2017-08-15 RX ORDER — DIGOXIN 125 MCG
TABLET ORAL
Qty: 30 TABLET | Refills: 11 | Status: SHIPPED | OUTPATIENT
Start: 2017-08-15

## 2017-08-15 RX ORDER — PHENYTOIN SODIUM 100 MG/1
CAPSULE, EXTENDED RELEASE ORAL
Qty: 60 CAPSULE | Refills: 11 | Status: SHIPPED | OUTPATIENT
Start: 2017-08-15

## 2017-08-15 RX ORDER — VENLAFAXINE HYDROCHLORIDE 150 MG/1
CAPSULE, EXTENDED RELEASE ORAL
Qty: 30 CAPSULE | Refills: 11 | Status: SHIPPED | OUTPATIENT
Start: 2017-08-15

## 2017-08-15 RX ORDER — PANTOPRAZOLE SODIUM 40 MG/1
TABLET, DELAYED RELEASE ORAL
Qty: 30 TABLET | Refills: 11 | Status: SHIPPED | OUTPATIENT
Start: 2017-08-15

## 2017-08-15 NOTE — TELEPHONE ENCOUNTER
[facility] called and patient INR is 1.33. Patient is taking  Coumadin regimen 4,4,2. Dr. Delcid ordered to do 4mg daily and recheck in 1 week. I relayed new orders to Lexi at [facility]. TP

## 2017-08-16 ENCOUNTER — RESULTS ENCOUNTER (OUTPATIENT)
Dept: FAMILY MEDICINE CLINIC | Facility: CLINIC | Age: 77
End: 2017-08-16

## 2017-08-16 DIAGNOSIS — G40.909 SEIZURE DISORDER (HCC): Chronic | ICD-10-CM

## 2017-08-23 ENCOUNTER — TELEPHONE (OUTPATIENT)
Dept: FAMILY MEDICINE CLINIC | Facility: CLINIC | Age: 77
End: 2017-08-23

## 2017-08-23 NOTE — TELEPHONE ENCOUNTER
I spoke to patient family and [facility]. She was discharged on Levaquin so Dr. Delcid ordered half dose of Coumadin while on the antibiotic. She was on Coumadin 4 mg so she will be taking 2 mg. Home health will recheck protimes daily.  Family voiced understanding. TP

## 2017-08-25 ENCOUNTER — TELEPHONE (OUTPATIENT)
Dept: FAMILY MEDICINE CLINIC | Facility: CLINIC | Age: 77
End: 2017-08-25

## 2017-08-25 NOTE — TELEPHONE ENCOUNTER
Spoke to Linda at [facility] with orders on Coumadin. Her INR is 1.58. She has been taking 2 mg daily because she is on Levaquin and Lovenox currently. She is normally on 4 mg daily.  Dr. Delcid ordered to take 5 mg as an extra dose today only. Repeat Protime 08/28/2017. TP

## 2017-08-28 ENCOUNTER — TELEPHONE (OUTPATIENT)
Dept: FAMILY MEDICINE CLINIC | Facility: CLINIC | Age: 77
End: 2017-08-28

## 2017-08-28 NOTE — TELEPHONE ENCOUNTER
08/28/2017 Spoke to Tiffany at [facility] with orders to give Coumadin 5mg today and increase to 4mg daily at bedtime. Repeat Protime 08/31/2017. GLENN

## 2017-08-31 ENCOUNTER — TELEPHONE (OUTPATIENT)
Dept: FAMILY MEDICINE CLINIC | Facility: CLINIC | Age: 77
End: 2017-08-31

## 2017-09-05 ENCOUNTER — TELEPHONE (OUTPATIENT)
Dept: FAMILY MEDICINE CLINIC | Facility: CLINIC | Age: 77
End: 2017-09-05

## 2017-09-05 NOTE — TELEPHONE ENCOUNTER
09/05/2017 Spoke to Morena at [facility]. Patient was recently increased to Coumadin 5 mg daily and has gone from 1.37 to 2.79 in 5 days. Instructed to take original dosage of 4mg and recheck in 1 week. TP

## 2017-09-11 RX ORDER — IPRATROPIUM BROMIDE AND ALBUTEROL SULFATE 2.5; .5 MG/3ML; MG/3ML
3 SOLUTION RESPIRATORY (INHALATION) EVERY 6 HOURS
Qty: 360 ML | Refills: 5 | Status: SHIPPED | OUTPATIENT
Start: 2017-09-11 | End: 2018-03-17 | Stop reason: SDUPTHER

## 2017-09-13 ENCOUNTER — TELEPHONE (OUTPATIENT)
Dept: FAMILY MEDICINE CLINIC | Facility: CLINIC | Age: 77
End: 2017-09-13

## 2017-09-13 NOTE — TELEPHONE ENCOUNTER
09/13/2017 relayed results to Tiffany at [facility]. Continues 4 mg daily and recheck in 1 week. TP

## 2017-09-15 ENCOUNTER — TELEPHONE (OUTPATIENT)
Dept: FAMILY MEDICINE CLINIC | Facility: CLINIC | Age: 77
End: 2017-09-15

## 2017-09-15 RX ORDER — AZITHROMYCIN 250 MG/1
TABLET, FILM COATED ORAL
Qty: 6 TABLET | Refills: 0 | Status: SHIPPED | OUTPATIENT
Start: 2017-09-15 | End: 2017-10-25

## 2017-09-15 RX ORDER — PREDNISONE 10 MG/1
TABLET ORAL
Qty: 24 TABLET | Refills: 0 | Status: SHIPPED | OUTPATIENT
Start: 2017-09-15 | End: 2017-10-25

## 2017-09-15 NOTE — TELEPHONE ENCOUNTER
----- Message from Chuck Delcid MD sent at 9/15/2017  9:46 AM CDT -----  I sent a prescription for antibiotics and prednisone taper to her pharmacy.  EB  ----- Message -----     From: Yung Norman RN     Sent: 9/15/2017   9:42 AM       To: Chuck Delcid MD    Home health called and states patient is wheezing with O2 sats 93 with oxygen. Productive cough yellow mucus x 2 days. TP    09/15/2017 I notified Home health and  The family. TP

## 2017-09-22 ENCOUNTER — TELEPHONE (OUTPATIENT)
Dept: FAMILY MEDICINE CLINIC | Facility: CLINIC | Age: 77
End: 2017-09-22

## 2017-09-22 NOTE — TELEPHONE ENCOUNTER
09/22/2017 INR is 2.08. She is taking Coumadin 4 mg daily and has gradually decreased. Ordered 5 mg on Mondays and Thursdays with 4 mg the other days. Repeat in 2 weeks. Spoke to Giancarlo at [facility]. GLENN

## 2017-10-23 ENCOUNTER — TELEPHONE (OUTPATIENT)
Dept: FAMILY MEDICINE CLINIC | Facility: CLINIC | Age: 77
End: 2017-10-23

## 2017-10-23 NOTE — TELEPHONE ENCOUNTER
I spoke to Lexi at [facility]. INR of 4.0. She was discharged from hospital and the records show she was on Coumadin 4 mg 5 times a week and 5mg 2 times a week. Dr. Delcid ordered to hold 2 days and go to 3 mg daily and recheck in 1 week. TP

## 2017-10-25 ENCOUNTER — OFFICE VISIT (OUTPATIENT)
Dept: FAMILY MEDICINE CLINIC | Facility: CLINIC | Age: 77
End: 2017-10-25

## 2017-10-25 VITALS
BODY MASS INDEX: 29.57 KG/M2 | OXYGEN SATURATION: 94 % | DIASTOLIC BLOOD PRESSURE: 70 MMHG | HEART RATE: 74 BPM | HEIGHT: 66 IN | WEIGHT: 184 LBS | TEMPERATURE: 97.2 F | SYSTOLIC BLOOD PRESSURE: 128 MMHG

## 2017-10-25 DIAGNOSIS — J44.1 COPD WITH EXACERBATION (HCC): Primary | ICD-10-CM

## 2017-10-25 DIAGNOSIS — I50.22 CHRONIC SYSTOLIC CONGESTIVE HEART FAILURE (HCC): Chronic | ICD-10-CM

## 2017-10-25 DIAGNOSIS — J43.1 PANLOBULAR EMPHYSEMA (HCC): Chronic | ICD-10-CM

## 2017-10-25 DIAGNOSIS — I50.23 ACUTE ON CHRONIC SYSTOLIC CONGESTIVE HEART FAILURE (HCC): ICD-10-CM

## 2017-10-25 PROCEDURE — 99214 OFFICE O/P EST MOD 30 MIN: CPT | Performed by: INTERNAL MEDICINE

## 2017-10-25 NOTE — PROGRESS NOTES
Subjective     History of Present Illness     Kathy Villanueva is a 77 y.o. female history of COPD and CHF among multiple medical issues here for a follow up hospitalization at [facility] for acute respiratory distress and mild CHF/COPD exacerbation. She was admitted to the ICU after presenting to the ER with dyspnea at [facility] on 10/15/17 and started on solu medrol, Levaquin, oxygen, and neb treatments.  She has a history of frequent episodes of pneumonia and hospital admissions occurring approximately every six weeks and treated with frequent rounds of antibiotics.  The Levaquin was stopped after no bacterial source was found.  Chest x-ray dated 10/17/17 showed mild interstitial lung disease.   She was treated with extra Lasix and BNP improved from 509 to 232. She was discharged on a prednisone taper.  Her son reports Dr. Campos, hospitalist, mentioned treating with chronic daily low dose prednisone.  We discussed this today and although, this may be something we add in the future, I recommended we wait at this time.  She reports her cough has improved since discharge and the sputum is now more clear than it was during hospitalization.  She continues on Duo Nebs every six hours.            Review of Systems   Constitutional: Negative for chills, fatigue and fever.   HENT: Negative for congestion, ear pain, postnasal drip, sinus pressure and sore throat.    Respiratory: Positive for cough and wheezing. Negative for shortness of breath.    Cardiovascular: Negative for chest pain, palpitations and leg swelling.   Gastrointestinal: Negative for abdominal pain, blood in stool, constipation, diarrhea, nausea and vomiting.   Endocrine: Negative for cold intolerance, heat intolerance, polydipsia and polyuria.   Genitourinary: Negative for dysuria, frequency, hematuria and urgency.   Skin: Negative for rash.   Neurological: Negative for syncope and weakness.     Objective     Vitals:    10/25/17 1417   BP: 128/70   Pulse: 74  "  Temp: 97.2 °F (36.2 °C)   TempSrc: Temporal Artery    SpO2: 94%   Weight: 184 lb (83.5 kg)   Height: 66\" (167.6 cm)     Physical Exam   Constitutional: She is oriented to person, place, and time. She appears well-developed and well-nourished. No distress.   Seated in a wheelchair, accompanied by her son.  Wearing supplemental oxygen.      HENT:   Head: Normocephalic and atraumatic.   Nose: Right sinus exhibits no maxillary sinus tenderness and no frontal sinus tenderness. Left sinus exhibits no maxillary sinus tenderness and no frontal sinus tenderness.   Mouth/Throat: Uvula is midline, oropharynx is clear and moist and mucous membranes are normal. No oral lesions. No tonsillar exudate.   Eyes: Conjunctivae and EOM are normal. Pupils are equal, round, and reactive to light.   Neck: Trachea normal. Neck supple. No JVD present. Carotid bruit is not present. No tracheal deviation present. No thyroid mass and no thyromegaly present.   Cardiovascular: Normal rate, regular rhythm and normal heart sounds.   No extrasystoles are present. PMI is not displaced.    No murmur heard.  Pulmonary/Chest: Effort normal. No accessory muscle usage. No respiratory distress. She has no decreased breath sounds. She has wheezes. She has no rhonchi. She has no rales.   Chronic lung sounds and a few wheezes heard in upper lobes.       Abdominal: Soft. Bowel sounds are normal. She exhibits no distension. There is no hepatosplenomegaly. There is no tenderness.       Vascular Status -  Her exam exhibits right foot vasculature normal. Her exam exhibits no right foot edema. Her exam exhibits left foot vasculature normal. Her exam exhibits no left foot edema.  Lymphadenopathy:     She has no cervical adenopathy.   Neurological: She is alert and oriented to person, place, and time. No cranial nerve deficit. Coordination normal.   Skin: Skin is warm, dry and intact. No rash noted. No cyanosis. Nails show no clubbing.   Psychiatric: She has a " normal mood and affect. Her speech is normal and behavior is normal. Thought content normal.   Vitals reviewed.    Assessment/Plan      Continue on her current medications including her neb treatments.  As they start to use heat in the winter months and the humidity is lower, I recommended use of a humidifier to help with the COPD/CHF flares.         Continue current medications including Entresto and Lasix to help prevent CHF exacerbation.    She currently keeps an air conditioner blowing on her face, even during the winter months.  I recommended use of a fan to avoid the cold air blowing on her face.    Monitor body weight closely watching for weight gain to suggest CHF exacerbation    Scribed for Dr. Delcid by Harika Linn [facility].     Diagnoses and all orders for this visit:    COPD with exacerbation    Panlobular emphysema    Chronic systolic congestive heart failure    Acute on chronic systolic congestive heart failure        No visits with results within 3 Week(s) from this visit.  Latest known visit with results is:    Lab on 07/26/2017   Component Date Value Ref Range Status   • Protime 07/26/2017 36.1* 11.1 - 15.3 Seconds Final   • INR 07/26/2017 3.55* 0.80 - 1.20 Final   ]

## 2017-10-30 ENCOUNTER — DOCUMENTATION (OUTPATIENT)
Dept: FAMILY MEDICINE CLINIC | Facility: CLINIC | Age: 77
End: 2017-10-30

## 2017-10-30 NOTE — PROGRESS NOTES
Spoke to Tiffany at [facility] regarding INR of 1.12. . She is currently taking Coumadin 3 mg daily. Dr. Delcid ordered to change to 4 mg daily and recheck in 1 week. TP

## 2017-11-06 ENCOUNTER — DOCUMENTATION (OUTPATIENT)
Dept: FAMILY MEDICINE CLINIC | Facility: CLINIC | Age: 77
End: 2017-11-06

## 2017-11-06 NOTE — PROGRESS NOTES
Spoke to Sejal at [facility] with INR of 1.27. She is currently taking Coumadin 3 mg daily.   Dr. Delcid changed to 3 mg, 4 mg alternating. Recheck in 1 week. TP

## 2017-11-10 ENCOUNTER — DOCUMENTATION (OUTPATIENT)
Dept: FAMILY MEDICINE CLINIC | Facility: CLINIC | Age: 77
End: 2017-11-10

## 2017-11-10 NOTE — PROGRESS NOTES
Patient INR on 11/06 was 1.27. We thought she was taking Coumadin 3 mg daily but actually was taking 4 mg daily.   Spoke to Morena today at [facility] to change to regimen 4,4,5 and recheck in 1 week. TP

## 2017-11-13 ENCOUNTER — DOCUMENTATION (OUTPATIENT)
Dept: FAMILY MEDICINE CLINIC | Facility: CLINIC | Age: 77
End: 2017-11-13

## 2017-11-13 NOTE — PROGRESS NOTES
Spoke to Tiffany at [facility] . Patient INR is 1.59. She has only been taking new dosage of 4,4,5 for a few days.   Instructed to do the same and repeat in 1 week. TP

## 2017-11-17 ENCOUNTER — TELEPHONE (OUTPATIENT)
Dept: FAMILY MEDICINE CLINIC | Facility: CLINIC | Age: 77
End: 2017-11-17

## 2017-11-17 RX ORDER — AZITHROMYCIN 250 MG/1
TABLET, FILM COATED ORAL
Qty: 6 TABLET | Refills: 0 | Status: SHIPPED | OUTPATIENT
Start: 2017-11-17 | End: 2018-01-26

## 2017-11-17 RX ORDER — PREDNISONE 10 MG/1
TABLET ORAL
Qty: 24 TABLET | Refills: 0 | Status: SHIPPED | OUTPATIENT
Start: 2017-11-17 | End: 2018-01-26

## 2017-11-17 RX ORDER — SACUBITRIL AND VALSARTAN 49; 51 MG/1; MG/1
TABLET, FILM COATED ORAL
Qty: 30 TABLET | Refills: 5 | Status: SHIPPED | OUTPATIENT
Start: 2017-11-17

## 2017-11-17 RX ORDER — WARFARIN SODIUM 4 MG/1
TABLET ORAL
Qty: 30 TABLET | Refills: 11 | Status: SHIPPED | OUTPATIENT
Start: 2017-11-17 | End: 2018-01-26 | Stop reason: SDUPTHER

## 2017-11-17 NOTE — TELEPHONE ENCOUNTER
[facility] called and states patient has productive cough with yellow mucus. O2 sat is 94%.     Dr. Delcid ordered antibiotic and prednisone taper. I relayed the message to family. TP

## 2017-11-21 ENCOUNTER — DOCUMENTATION (OUTPATIENT)
Dept: FAMILY MEDICINE CLINIC | Facility: CLINIC | Age: 77
End: 2017-11-21

## 2017-11-21 NOTE — PROGRESS NOTES
Spoke to Tiffany at [facility] with INR of 2.04.  She is taking,4,4,5   Recheck in 1 week. TP

## 2017-11-27 ENCOUNTER — DOCUMENTATION (OUTPATIENT)
Dept: FAMILY MEDICINE CLINIC | Facility: CLINIC | Age: 77
End: 2017-11-27

## 2017-11-27 NOTE — PROGRESS NOTES
Spoke to Lexi at [facility] . Patient INR was 1.57. She takes Coumadin regimen 4,4,5. Dr. Delcid changed to 4,5,4,5, and recheck in 1 week. TP

## 2017-12-05 ENCOUNTER — TELEPHONE (OUTPATIENT)
Dept: FAMILY MEDICINE CLINIC | Facility: CLINIC | Age: 77
End: 2017-12-05

## 2017-12-05 NOTE — TELEPHONE ENCOUNTER
Spoke to Brit at [facility] with results of PT/INR of 2.59. She was 1.5 last week and was increased to alternating 4,5,4,5. Instructed to recheck in 1 week. TP

## 2017-12-11 ENCOUNTER — DOCUMENTATION (OUTPATIENT)
Dept: FAMILY MEDICINE CLINIC | Facility: CLINIC | Age: 77
End: 2017-12-11

## 2017-12-11 NOTE — PROGRESS NOTES
Spoke to [facility] . Patient INR is 2.94. She is taking regimen 4,5,4,5 repeat in 1 week. TP

## 2017-12-26 ENCOUNTER — DOCUMENTATION (OUTPATIENT)
Dept: FAMILY MEDICINE CLINIC | Facility: CLINIC | Age: 77
End: 2017-12-26

## 2017-12-26 NOTE — PROGRESS NOTES
Spoke to Adilia at [facility]. Patient INR is 2.38. She has been In the hospital and Coumadin dose currently is 3 mg daily. Instructed to recheck in 1 week. GLENN

## 2018-01-03 ENCOUNTER — TELEPHONE (OUTPATIENT)
Dept: FAMILY MEDICINE CLINIC | Facility: CLINIC | Age: 78
End: 2018-01-03

## 2018-01-03 NOTE — TELEPHONE ENCOUNTER
Called pt and notified of PT/INR  To continue same dose and recheck in 1 week.  Pt voiced understanding and thanked for call

## 2018-01-08 ENCOUNTER — DOCUMENTATION (OUTPATIENT)
Dept: FAMILY MEDICINE CLINIC | Facility: CLINIC | Age: 78
End: 2018-01-08

## 2018-01-08 NOTE — PROGRESS NOTES
Spoke to Patience at [facility]. INR is 3.55. She is taking 3mg daily. Change to regimen 3,3,2 and recheck in 1 week. TP

## 2018-01-19 ENCOUNTER — DOCUMENTATION (OUTPATIENT)
Dept: FAMILY MEDICINE CLINIC | Facility: CLINIC | Age: 78
End: 2018-01-19

## 2018-01-19 NOTE — PROGRESS NOTES
Spoke to Evonne regarding current Protime/INR of 1.09. She is currently taking Coumadin regimen 2mg, 2mg, 1mg. Dr. Delcid ordered 2mg daily and recheck in 1 week  Also to check for compliance. TP

## 2018-01-26 ENCOUNTER — DOCUMENTATION (OUTPATIENT)
Dept: FAMILY MEDICINE CLINIC | Facility: CLINIC | Age: 78
End: 2018-01-26

## 2018-01-26 ENCOUNTER — OFFICE VISIT (OUTPATIENT)
Dept: FAMILY MEDICINE CLINIC | Facility: CLINIC | Age: 78
End: 2018-01-26

## 2018-01-26 VITALS
BODY MASS INDEX: 28.93 KG/M2 | HEART RATE: 92 BPM | WEIGHT: 180 LBS | SYSTOLIC BLOOD PRESSURE: 114 MMHG | HEIGHT: 66 IN | DIASTOLIC BLOOD PRESSURE: 70 MMHG | OXYGEN SATURATION: 94 % | TEMPERATURE: 97 F

## 2018-01-26 DIAGNOSIS — I48.20 CHRONIC ATRIAL FIBRILLATION (HCC): Chronic | ICD-10-CM

## 2018-01-26 DIAGNOSIS — J43.1 PANLOBULAR EMPHYSEMA (HCC): Primary | Chronic | ICD-10-CM

## 2018-01-26 DIAGNOSIS — Z92.89 HOSPITALIZATION WITHIN LAST 30 DAYS: ICD-10-CM

## 2018-01-26 DIAGNOSIS — Z79.01 CHRONIC ANTICOAGULATION: Chronic | ICD-10-CM

## 2018-01-26 DIAGNOSIS — I50.22 CHRONIC SYSTOLIC CONGESTIVE HEART FAILURE (HCC): Chronic | ICD-10-CM

## 2018-01-26 PROCEDURE — 99214 OFFICE O/P EST MOD 30 MIN: CPT | Performed by: INTERNAL MEDICINE

## 2018-01-26 RX ORDER — ROFLUMILAST 500 UG/1
1 TABLET ORAL DAILY
COMMUNITY
Start: 2018-01-15

## 2018-01-26 RX ORDER — POTASSIUM CHLORIDE 750 MG/1
10 TABLET, FILM COATED, EXTENDED RELEASE ORAL DAILY
Qty: 30 TABLET | Refills: 11 | Status: SHIPPED | OUTPATIENT
Start: 2018-01-26 | End: 2019-01-22

## 2018-01-26 RX ORDER — ATORVASTATIN CALCIUM 20 MG/1
20 TABLET, FILM COATED ORAL
COMMUNITY

## 2018-01-26 RX ORDER — VALSARTAN 80 MG/1
1 TABLET ORAL DAILY
COMMUNITY
Start: 2017-12-26

## 2018-01-26 RX ORDER — WARFARIN SODIUM 4 MG/1
4 TABLET ORAL EVERY EVENING
Qty: 30 TABLET | Refills: 11 | Status: SHIPPED | OUTPATIENT
Start: 2018-01-26 | End: 2018-05-04 | Stop reason: ALTCHOICE

## 2018-01-26 RX ORDER — PREDNISONE 10 MG/1
10 TABLET ORAL DAILY
Qty: 30 TABLET | Refills: 0 | Status: SHIPPED | OUTPATIENT
Start: 2018-01-26 | End: 2018-05-04 | Stop reason: ALTCHOICE

## 2018-01-26 RX ORDER — NEBIVOLOL 2.5 MG/1
2.5 TABLET ORAL DAILY
COMMUNITY

## 2018-01-26 NOTE — PROGRESS NOTES
Spoke to Adilia at [facility]. Patient INR was 1.2 and patient family states she is taking 2mg daily. Dr. Delcid increased to 4mg daily and they will repeat in 1 week. TP

## 2018-01-26 NOTE — PROGRESS NOTES
"Subjective        History of Present Illness     Kathy Villanueva is a 77 y.o. female with history of COPD and CHF among multiple medical issues who presents for hospital follow up.  She has frequent COPD and CHF flares.  She was directly admitted to the medical unit at [facility] from Dr. Iniguez's cardiology office on 01/10/18 for epistaxis and elevated INR.  Her dose of Coumadin was decreased from 4 mg to 2 mg.  Her prior hospitalization was when she was discharged on 12/24/2017 after exacerbations  of CHF and COPD.  Chest X-ray was negative for pulmonary congestion.  She was discharged the following day on 01/11/18 on prednisone 10 mg daily.  She continues to have a frequent cough productive of yellow sputum.  She reports she is sleeping well.      Labs dated 01/25/18 revealed INR subtherapeutic on Coumadin 2 mg daily.  We will have her resume the 4 mg of Coumadin.  Home Health will continue with weekly checks of PT/INR.        Review of Systems   Constitutional: Negative for chills, fatigue and fever.   HENT: Negative for congestion, ear pain, postnasal drip, sinus pressure and sore throat.    Respiratory: Positive for cough and wheezing. Negative for shortness of breath.    Cardiovascular: Negative for chest pain, palpitations and leg swelling.   Gastrointestinal: Negative for abdominal pain, blood in stool, constipation, diarrhea, nausea and vomiting.   Endocrine: Negative for cold intolerance, heat intolerance, polydipsia and polyuria.   Genitourinary: Negative for dysuria, frequency, hematuria and urgency.   Skin: Negative for rash.   Neurological: Negative for syncope and weakness.        Objective      Vitals:    01/26/18 1104   BP: 114/70   Pulse: 92   Temp: 97 °F (36.1 °C)   TempSrc: Oral   SpO2: 94%   Weight: 81.6 kg (180 lb)   Height: 167.6 cm (66\")       Physical Exam   Constitutional: She is oriented to person, place, and time. She appears well-developed and well-nourished. No distress.   Accompanied by " her daughter-in-law's sister, Saniya. Seated in a wheelchair.  Wearing supplemental oxygen.     HENT:   Head: Normocephalic and atraumatic.   Nose: Right sinus exhibits no maxillary sinus tenderness and no frontal sinus tenderness. Left sinus exhibits no maxillary sinus tenderness and no frontal sinus tenderness.   Mouth/Throat: Uvula is midline, oropharynx is clear and moist and mucous membranes are normal. No oral lesions. No tonsillar exudate.   Eyes: Conjunctivae and EOM are normal. Pupils are equal, round, and reactive to light.   Neck: Trachea normal. Neck supple. No JVD present. Carotid bruit is not present. No tracheal deviation present. No thyroid mass and no thyromegaly present.   Cardiovascular: Normal rate, regular rhythm, normal heart sounds and intact distal pulses.   No extrasystoles are present. PMI is not displaced.    No murmur heard.  Pulmonary/Chest: Effort normal. No accessory muscle usage. No respiratory distress. She has no decreased breath sounds. She has wheezes. She has rhonchi. She has no rales.   Chronic lung sounds. Scattered rhonchi and a few expiratory wheezes.   Abdominal: Soft. Bowel sounds are normal. She exhibits no distension. There is no hepatosplenomegaly. There is no tenderness.       Vascular Status -  Her exam exhibits right foot edema (Trace edema of bilateral lower extremities with diminished pulses. ). Her exam exhibits right foot vasculature abnormal. Her exam exhibits left foot edema. Her exam exhibits left foot vasculature abnormal.  Lymphadenopathy:     She has no cervical adenopathy.   Neurological: She is alert and oriented to person, place, and time. No cranial nerve deficit. Coordination normal.   Skin: Skin is warm, dry and intact. No rash noted. No cyanosis. Nails show no clubbing.   Psychiatric: She has a normal mood and affect. Her speech is normal and behavior is normal. Judgment and thought content normal.   Vitals reviewed.      Assessment/Plan      Continue on  her current medications including her neb treatments.  She will continue on prednisone 10 mg daily for 30 days.     Increase Coumadin to 4 mg daily.  Home Health will continue to monitor PT/INR checks.    Monitor body weight closely watching for weight gain to suggest CHF exacerbation    Current outpatient and discharge medications have been reconciled for the patient.  Chuck Delcid MD    Scribed for Dr. Delcid by Harika [facility].     Diagnoses and all orders for this visit:    Panlobular emphysema    Chronic atrial fibrillation    Chronic systolic congestive heart failure    Chronic anticoagulation    Hospitalization within last 30 days    Other orders  -     valsartan (DIOVAN) 80 MG tablet; Take 1 tablet by mouth Daily.  -     DALIRESP 500 MCG tablet tablet; Take 1 tablet by mouth Daily.  -     nebivolol (BYSTOLIC) 2.5 MG tablet; Take 2.5 mg by mouth Daily.  -     atorvastatin (LIPITOR) 20 MG tablet; Take 20 mg by mouth. 3 times weekly  -     potassium chloride (K-DUR) 10 MEQ CR tablet; Take 1 tablet by mouth Daily for 361 days.  -     predniSONE (DELTASONE) 10 MG tablet; Take 1 tablet by mouth Daily.  -     warfarin (COUMADIN) 4 MG tablet; Take 1 tablet by mouth Every Evening.      No visits with results within 3 Week(s) from this visit.  Latest known visit with results is:    Lab on 07/26/2017   Component Date Value Ref Range Status   • Protime 07/26/2017 36.1* 11.1 - 15.3 Seconds Final   • INR 07/26/2017 3.55* 0.80 - 1.20 Final   ]

## 2018-02-01 ENCOUNTER — TELEPHONE (OUTPATIENT)
Dept: FAMILY MEDICINE CLINIC | Facility: CLINIC | Age: 78
End: 2018-02-01

## 2018-02-01 NOTE — TELEPHONE ENCOUNTER
Spoke to Adilia at [facility] with INR of 1.45. She is taking 4 mg Coumadin daily. Recheck in 1 week. TP

## 2018-02-08 ENCOUNTER — DOCUMENTATION (OUTPATIENT)
Dept: FAMILY MEDICINE CLINIC | Facility: CLINIC | Age: 78
End: 2018-02-08

## 2018-02-08 NOTE — PROGRESS NOTES
Spoke to Linda at [facility]. Patient is taking 4 mg and is to continue. Recheck  in 1 week. Her INR was 1.76 today. TP

## 2018-02-15 RX ORDER — WARFARIN SODIUM 1 MG/1
TABLET ORAL
Qty: 50 TABLET | Refills: 0 | OUTPATIENT
Start: 2018-02-15

## 2018-02-19 ENCOUNTER — DOCUMENTATION (OUTPATIENT)
Dept: FAMILY MEDICINE CLINIC | Facility: CLINIC | Age: 78
End: 2018-02-19

## 2018-03-09 ENCOUNTER — DOCUMENTATION (OUTPATIENT)
Dept: FAMILY MEDICINE CLINIC | Facility: CLINIC | Age: 78
End: 2018-03-09

## 2018-03-09 NOTE — PROGRESS NOTES
[facility] called and her PT/INR is 1.98. She is taking Coumadin 4mg x 2 weeks. Recheck in 2 weeks. TP

## 2018-03-19 RX ORDER — IPRATROPIUM BROMIDE AND ALBUTEROL SULFATE 2.5; .5 MG/3ML; MG/3ML
SOLUTION RESPIRATORY (INHALATION)
Qty: 360 ML | Refills: 5 | Status: SHIPPED | OUTPATIENT
Start: 2018-03-19

## 2018-05-04 ENCOUNTER — OFFICE VISIT (OUTPATIENT)
Dept: FAMILY MEDICINE CLINIC | Facility: CLINIC | Age: 78
End: 2018-05-04

## 2018-05-04 VITALS
HEIGHT: 66 IN | DIASTOLIC BLOOD PRESSURE: 50 MMHG | OXYGEN SATURATION: 90 % | TEMPERATURE: 98.5 F | HEART RATE: 112 BPM | SYSTOLIC BLOOD PRESSURE: 100 MMHG

## 2018-05-04 DIAGNOSIS — J43.1 PANLOBULAR EMPHYSEMA (HCC): Chronic | ICD-10-CM

## 2018-05-04 DIAGNOSIS — D51.8 VITAMIN B12 DEFICIENCY (DIETARY) ANEMIA: Chronic | ICD-10-CM

## 2018-05-04 DIAGNOSIS — J44.1 COPD WITH ACUTE EXACERBATION (HCC): Primary | ICD-10-CM

## 2018-05-04 DIAGNOSIS — I48.20 CHRONIC ATRIAL FIBRILLATION (HCC): Chronic | ICD-10-CM

## 2018-05-04 DIAGNOSIS — Z09 HOSPITAL DISCHARGE FOLLOW-UP: ICD-10-CM

## 2018-05-04 DIAGNOSIS — I50.22 CHRONIC SYSTOLIC CONGESTIVE HEART FAILURE (HCC): Chronic | ICD-10-CM

## 2018-05-04 DIAGNOSIS — G40.909 SEIZURE DISORDER (HCC): ICD-10-CM

## 2018-05-04 DIAGNOSIS — I10 ESSENTIAL HYPERTENSION: Chronic | ICD-10-CM

## 2018-05-04 PROCEDURE — 99214 OFFICE O/P EST MOD 30 MIN: CPT | Performed by: INTERNAL MEDICINE

## 2018-05-04 RX ORDER — PREDNISONE 1 MG/1
1 TABLET ORAL DAILY
COMMUNITY
Start: 2018-04-16

## 2018-05-04 RX ORDER — FUROSEMIDE 40 MG/1
1 TABLET ORAL DAILY
COMMUNITY
Start: 2018-04-16

## 2018-05-04 RX ORDER — AZITHROMYCIN 250 MG/1
TABLET, FILM COATED ORAL
Qty: 6 TABLET | Refills: 0 | Status: SHIPPED | OUTPATIENT
Start: 2018-05-04

## 2018-05-04 RX ORDER — PREDNISONE 10 MG/1
TABLET ORAL
Qty: 24 TABLET | Refills: 0 | Status: SHIPPED | OUTPATIENT
Start: 2018-05-04

## 2018-05-04 RX ORDER — APIXABAN 5 MG/1
1 TABLET, FILM COATED ORAL 2 TIMES DAILY
COMMUNITY
Start: 2018-04-02 | End: 2018-05-04

## 2018-05-04 NOTE — PROGRESS NOTES
Subjective        History of Present Illness     Kathy Villanueva is a 77 y.o. female  with history of COPD and CHF among multiple medical issues who presents for hospital follow up.  She has frequent COPD and CHF flares. Current outpatient and discharge medications have been reconciled for the patient today.  She is scheduled to be discharged from Home Health on May 7th due to not meeting medical requirements.  During the recent hospitalization at [facility], the long-term prednisone 5 mg po daily was discontinued and that the Lasix was changed back to 40 mg po daily instead of the alternating dose of 40 mg and 60 mg.  She was released from [facility] on 4/3/18 after an 11-day hospital stay with a diagnosis of sepsis, COPD exacerbation, and pneumonia.  At her recent follow-up visit with BRANDON Philippe, at Dr. Iniguez's office, she was started back the Prednisone 5 mg po daily and alternating Lasix dose of 40 mg and 60 mg.  She is on new anticoagulant, Eliquis 5 mg daily, that does not require frequent labs like Coumadin.  Due to her age, I am going to decrease the dose to 2.5 mg daily.      She has been home for a month now and is starting to have increased frequency of cough productive of clear sputum consistent with a COPD exacerbation.  Denies fever.  She is on guaifenesin t.i.d.  I recommended a daily antihistamine during the peak allergy season.         Review of Systems   Constitutional: Negative for chills, fatigue and fever.   HENT: Negative for congestion, ear pain, postnasal drip, sinus pressure and sore throat.    Respiratory: Positive for cough, shortness of breath and wheezing.    Cardiovascular: Negative for chest pain, palpitations and leg swelling.   Gastrointestinal: Negative for abdominal pain, blood in stool, constipation, diarrhea, nausea and vomiting.   Endocrine: Negative for cold intolerance, heat intolerance, polydipsia and polyuria.   Genitourinary: Negative for dysuria, frequency, hematuria and  "urgency.   Skin: Negative for rash.   Neurological: Negative for syncope and weakness.        Objective     Vitals:    05/04/18 1308   BP: 100/50   BP Location: Left arm   Patient Position: Sitting   Cuff Size: Large Adult   Pulse: 112   Temp: 98.5 °F (36.9 °C)   TempSrc: Oral   SpO2: 90%   Height: 167.6 cm (66\")     Physical Exam   Constitutional: She is oriented to person, place, and time. She appears well-developed and well-nourished. No distress.   Accompanied by her daughter-in-law, Kim. Seated in a wheelchair.  Wearing supplemental oxygen.     HENT:   Head: Normocephalic and atraumatic.   Nose: Right sinus exhibits no maxillary sinus tenderness and no frontal sinus tenderness. Left sinus exhibits no maxillary sinus tenderness and no frontal sinus tenderness.   Mouth/Throat: Uvula is midline, oropharynx is clear and moist and mucous membranes are normal. No oral lesions. No tonsillar exudate.   Oxygen per nasal cannula.       Eyes: Conjunctivae and EOM are normal. Pupils are equal, round, and reactive to light.   Neck: Trachea normal. Neck supple. No JVD present. Carotid bruit is not present. No tracheal deviation present. No thyroid mass and no thyromegaly present.   Cardiovascular: Normal rate, normal heart sounds and intact distal pulses.   No extrasystoles are present. PMI is not displaced.    No murmur heard.  Irregular rhythm with rate around 80.      Pulmonary/Chest: Effort normal. No accessory muscle usage. No respiratory distress. She has no decreased breath sounds. She has wheezes. She has rhonchi. She has no rales.   Chronic lung sounds. Bilateral wheezes and bilateral rhonchi    I   Abdominal: Soft. Bowel sounds are normal. She exhibits no distension. There is no hepatosplenomegaly. There is no tenderness.     Vascular Status -  Her right foot exhibits normal foot vasculature  and no edema. Her left foot exhibits normal foot vasculature  and no edema.  Lymphadenopathy:     She has no cervical " adenopathy.   Neurological: She is alert and oriented to person, place, and time. No cranial nerve deficit. Coordination normal.   Skin: Skin is warm, dry and intact. No rash noted. No cyanosis. Nails show no clubbing.   Psychiatric: She has a normal mood and affect. Her speech is normal and behavior is normal. Judgment and thought content normal.   Vitals reviewed.    Assessment/Plan      Current outpatient and discharge medications have been reconciled for the patient today.    For the COPD exacerbation, a prescription is sent for Z-pack as directed and prednisone 10 mg taper as directed. Continue on guaifenesin 400 mg t.i.d.  I recommended daily antihistamine.      For her chronic atrial fibrillation, we will decrease the Eliquis to 2.5 mg b.i.d. Due to age. Monitor for bleeding.     Continue current CHF meds. Keep follow-up with Dr. Iniguez.      Return in three months for a follow up with labs prior.    Scribed for Dr. Delcid by Harika Linn [facility].     Diagnoses and all orders for this visit:    COPD with acute exacerbation  -     azithromycin (ZITHROMAX) 250 MG tablet; Take 2 tablets the first day, then 1 tablet daily for 4 days.  -     predniSONE (DELTASONE) 10 MG tablet; 1 by mouth 3 times a day times 4 days, then 1 by mouth twice a day times 4 days, then 1 by mouth daily times 4 days    Hospital discharge follow-up    Chronic atrial fibrillation  -     TSH; Future    Chronic systolic congestive heart failure  -     Comprehensive Metabolic Panel; Future  -     CBC Auto Differential; Future  -     BNP; Future  -     Lipid Panel; Future    Essential hypertension  -     Lipid Panel; Future    Panlobular emphysema    Vitamin B12 deficiency (dietary) anemia  -     Vitamin B12; Future    Seizure disorder  -     Phenytoin Level, Total; Future    Other orders  -     Discontinue: ELIQUIS 5 MG tablet tablet; Take 1 tablet by mouth 2 (Two) Times a Day.  -     furosemide (LASIX) 40 MG tablet; Take 1 tablet by mouth  Daily.  -     predniSONE (DELTASONE) 5 MG tablet; Take 1 tablet by mouth Daily.  -     apixaban (ELIQUIS) 2.5 MG tablet tablet; Take 1 tablet by mouth 2 (Two) Times a Day.        No visits with results within 3 Week(s) from this visit.   Latest known visit with results is:   Lab on 07/26/2017   Component Date Value Ref Range Status   • Protime 07/26/2017 36.1* 11.1 - 15.3 Seconds Final   • INR 07/26/2017 3.55* 0.80 - 1.20 Final   ]

## 2018-05-30 DIAGNOSIS — D51.8 VITAMIN B12 DEFICIENCY (DIETARY) ANEMIA: Chronic | ICD-10-CM

## 2018-05-30 DIAGNOSIS — I50.22 CHRONIC SYSTOLIC CONGESTIVE HEART FAILURE (HCC): Chronic | ICD-10-CM

## 2018-10-19 ENCOUNTER — TELEPHONE (OUTPATIENT)
Dept: FAMILY MEDICINE CLINIC | Facility: CLINIC | Age: 78
End: 2018-10-19

## 2018-10-19 NOTE — TELEPHONE ENCOUNTER
10/19/2018 relayed instructions to Fay. TP      ---- Message from Chuck Delcid MD sent at 10/19/2018 10:25 AM CDT -----  Repeat a chest x-ray.  Draw a BNP today.  Monitor O2 sats each shift for one week.  Monitor O2 sats when ambulating.  They may increase oxygen to 4 L/m when ambulating if needed, but he surely resume her prior chronic oxygen when she returns to the bed.  If her condition worsens, Center to the emergency room.  EB  ----- Message -----  From: Yung Norman RN  Sent: 10/19/2018   9:44 AM  To: Chuck Delcid MD    Fay called from SN[facility]. While walking patient today she became extremely SOB and Fay said was worse than she ever has been. She finished Z brie the 16th. Continues the Pred taper. Lung sounds wheezing on the left and diminished on the right. Also noted feet were purple while walking. TP
